# Patient Record
(demographics unavailable — no encounter records)

---

## 2024-10-31 NOTE — HISTORY OF PRESENT ILLNESS
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  Pt was born at weeks.  There is no family history of ear deformity. Mom denies complications with pregnancy and delivery there is no significant past medical or surgical history Parent reports normal feeding and elimination patterns and normal infant development.  Age appropriate milestones and behavior. Infant hearing screen passed. Appropriate weight gain.